# Patient Record
Sex: FEMALE | Race: WHITE | Employment: FULL TIME | URBAN - METROPOLITAN AREA
[De-identification: names, ages, dates, MRNs, and addresses within clinical notes are randomized per-mention and may not be internally consistent; named-entity substitution may affect disease eponyms.]

---

## 2014-09-01 LAB — COLONOSCOPY, EXTERNAL: NORMAL

## 2016-04-11 LAB — MAMMOGRAPHY, EXTERNAL: NORMAL

## 2016-11-01 LAB — AMB DEXA, EXTERNAL: NORMAL

## 2017-08-18 PROBLEM — Z00.00 ANNUAL PHYSICAL EXAM: Status: ACTIVE | Noted: 2017-08-18

## 2017-08-18 PROBLEM — E78.00 HYPERCHOLESTEROLEMIA: Status: ACTIVE | Noted: 2017-08-18

## 2017-08-18 PROBLEM — M70.72 BURSITIS OF LEFT HIP: Status: ACTIVE | Noted: 2017-08-18

## 2017-08-18 PROBLEM — K21.9 CHRONIC GERD: Status: ACTIVE | Noted: 2017-08-18

## 2017-08-18 PROBLEM — R39.9 UTI SYMPTOMS: Status: ACTIVE | Noted: 2017-08-18

## 2017-08-18 PROBLEM — M81.0 OSTEOPOROSIS: Status: ACTIVE | Noted: 2017-08-18

## 2017-08-18 RX ORDER — NEBIVOLOL 10 MG/1
TABLET ORAL DAILY
COMMUNITY
End: 2018-01-09 | Stop reason: DRUGHIGH

## 2017-08-18 RX ORDER — RISEDRONATE SODIUM 150 MG/1
150 TABLET, FILM COATED ORAL
COMMUNITY

## 2017-09-14 ENCOUNTER — LAB ONLY (OUTPATIENT)
Dept: INTERNAL MEDICINE CLINIC | Age: 62
End: 2017-09-14

## 2017-09-14 DIAGNOSIS — Z00.00 ROUTINE GENERAL MEDICAL EXAMINATION AT A HEALTH CARE FACILITY: Primary | ICD-10-CM

## 2017-09-14 LAB
ALBUMIN SERPL-MCNC: 4.3 G/DL (ref 3.9–5.4)
ALKALINE PHOS POC: 104 U/L (ref 38–126)
ALT SERPL-CCNC: 29 U/L (ref 9–52)
AST SERPL-CCNC: 25 U/L (ref 14–36)
BACTERIA UA POCT, BACTPOCT: NORMAL
BILIRUB UR QL STRIP: NEGATIVE
BUN BLD-MCNC: 13 MG/DL (ref 7–17)
CALCIUM BLD-MCNC: 9.3 MG/DL (ref 8.4–10.2)
CASTS UA POCT: 0
CHLORIDE BLD-SCNC: 106 MMOL/L (ref 98–107)
CHOLEST SERPL-MCNC: 212 MG/DL (ref 0–200)
CLUE CELLS, CLUEPOCT: NEGATIVE
CO2 POC: 27 MMOL/L (ref 22–32)
CREAT BLD-MCNC: 0.7 MG/DL (ref 0.7–1.2)
CRYSTALS UA POCT, CRYSPOCT: NEGATIVE
EGFR (POC): 94.8
EPITHELIAL CELLS POCT, EPITHPOCT: NORMAL
GLUCOSE POC: 94 MG/DL (ref 65–105)
GLUCOSE UR-MCNC: NEGATIVE MG/DL
GRAN# POC: 4.5 K/UL (ref 2–7.8)
GRAN% POC: 67.6 % (ref 37–92)
HCT VFR BLD CALC: 42.4 % (ref 37–51)
HDLC SERPL-MCNC: 54 MG/DL (ref 35–130)
HGB BLD-MCNC: 14 G/DL (ref 12–18)
IRON POC: 120 UG/DL (ref 37–170)
IRON SATURATION POC: 26 % (ref 15–55)
KETONES P FAST UR STRIP-MCNC: NEGATIVE MG/DL
LDL CHOLESTEROL POC: 129.4 MG/DL (ref 0–130)
LY# POC: 1.8 K/UL (ref 0.6–4.1)
LY% POC: 28.7 % (ref 10–58.5)
MCH RBC QN: 28.7 PG (ref 26–32)
MCHC RBC-ENTMCNC: 33.1 G/DL (ref 30–36)
MCV RBC: 87 FL (ref 80–97)
MID #, POC: 0.2 K/UL (ref 0–1.8)
MID% POC: 3.7 % (ref 0.1–24)
MUCUS UA POCT, MUCPOCT: NORMAL
PH UR STRIP: 7 [PH] (ref 5–7)
PLATELET # BLD: 260 K/UL (ref 140–440)
POTASSIUM SERPL-SCNC: 4.5 MMOL/L (ref 3.6–5)
PROT SERPL-MCNC: 7.1 G/DL (ref 6.3–8.2)
PROTEIN,URINE POC: NEGATIVE MG/DL
RBC # BLD: 4.89 M/UL (ref 4.2–6.3)
RBC UA POCT, RBCPOCT: 0
SODIUM SERPL-SCNC: 145 MMOL/L (ref 137–145)
SP GR UR STRIP: 1.01 (ref 1.01–1.02)
TCHOL/HDL RATIO (POC): 3.9 (ref 0–4)
TIBC POC: 457 UG/DL (ref 265–497)
TOTAL BILIRUBIN POC: 0.7 MG/DL (ref 0.2–1.3)
TRICH UA POCT, TRICHPOC: NEGATIVE
TRIGL SERPL-MCNC: 143 MG/DL (ref 0–200)
TSH BLD-ACNC: 0.98 UIU/ML (ref 0.4–4.2)
UA UROBILINOGEN AMB POC: NORMAL (ref 0.2–1)
URINALYSIS CLARITY POC: CLEAR
URINALYSIS COLOR POC: NORMAL
URINE BLOOD POC: NEGATIVE
URINE LEUKOCYTES POC: NEGATIVE
URINE NITRITES POC: NEGATIVE
VITAMIN D POC: 47.1 NG/ML (ref 30–96)
VLDLC SERPL CALC-MCNC: 28.6 MG/DL
WBC # BLD: 6.5 K/UL (ref 4.1–10.9)
WBC UA POCT, WBCPOCT: NORMAL
YEAST UA POCT, YEASTPOC: NEGATIVE

## 2017-10-09 ENCOUNTER — OFFICE VISIT (OUTPATIENT)
Dept: INTERNAL MEDICINE CLINIC | Age: 62
End: 2017-10-09

## 2017-10-09 VITALS
OXYGEN SATURATION: 97 % | RESPIRATION RATE: 20 BRPM | DIASTOLIC BLOOD PRESSURE: 80 MMHG | TEMPERATURE: 97.6 F | BODY MASS INDEX: 30.76 KG/M2 | HEART RATE: 66 BPM | WEIGHT: 173.6 LBS | SYSTOLIC BLOOD PRESSURE: 138 MMHG | HEIGHT: 63 IN

## 2017-10-09 DIAGNOSIS — Z00.00 ANNUAL PHYSICAL EXAM: ICD-10-CM

## 2017-10-09 DIAGNOSIS — Z23 ENCOUNTER FOR IMMUNIZATION: Primary | ICD-10-CM

## 2017-10-09 DIAGNOSIS — Z23 NEED FOR ZOSTAVAX ADMINISTRATION: ICD-10-CM

## 2017-10-09 DIAGNOSIS — M81.0 AGE-RELATED OSTEOPOROSIS WITHOUT CURRENT PATHOLOGICAL FRACTURE: ICD-10-CM

## 2017-10-09 RX ORDER — ALENDRONATE SODIUM 70 MG/1
70 TABLET ORAL
Qty: 4 TAB | Refills: 11 | Status: SHIPPED | OUTPATIENT
Start: 2017-10-09

## 2017-10-09 RX ORDER — RANITIDINE 150 MG/1
150 CAPSULE ORAL 2 TIMES DAILY
COMMUNITY

## 2017-10-09 RX ORDER — MINERAL OIL
ENEMA (ML) RECTAL
COMMUNITY

## 2017-10-09 NOTE — LETTER
10/9/2017 12:53 PM 
 
Ms. Mario Riley [address] [facility] [address] [id number] Dear Itzel Ken: 
 
I am writing this letter as a follow-up to your recent physical examination. I have enclosed copies of your lab work for your review. After going over this information, if you have any questions please give me a call. Your current active and past medical problems include: 1. Hypertension. 2. Hypercholesterolemia. 3. Nonallergic rhinitis. 4. You've had colon polyps removed. 5. You've had endometrial ablation for menorrhagia. 6. You had angioneurotic edema secondary to Ramipril. 7. You had a CT scan of the heart in September, 2013. Your coronary artery calcium score was negligible. 8. Osteopenia in the moderate range in your hip and osteoporosis in the lumbar spine. Your current medications are: 1. Bystolic 10 mg daily. 2. Aspirin 81 mg daily. 3. Fexofenadine 180 mg daily. 4. Ranitidine 300 mg daily. 5. Alendronate 70 mg weekly. On physical examination, your height was 5'3\". Your weight was 173. BMI 30.7. Your blood pressure was 156/84. Your pulse was 60 and regular. Other than the elevated blood pressure your physical examination was normal.  Your resting electrocardiogram was normal, as were pulmonary function testing. Your laboratory studies revealed your hemoglobin and iron stores to be normal.  Your blood sugar, potassium, kidney function, calcium level and liver function tests were normal.  Total cholesterol was 212, triglycerides were 143, LDL (bad cholesterol) was 129 and HDL (good cholesterol) was 54. Using these numbers, I calculated a 10 year risk of stroke or heart attack at 6.4%. As long as that number is less than 7.5%, statin drugs are not indicated. Thyroid function, vitamin D level and UA are all normal. 
 
We updated your influenza vaccine.  I sent a prescription for the shingles vaccine, which is indicated at age 63 wait 30 days after the flu vaccine to get that. Tetanus and whooping cough vaccines are current. Your mammogram is overdue and you plan to schedule that. Your next colonoscopy will be due in 2019. After you have taken Alendronate for two years we will repeat your bone density test.  Your eye exam is current with [facility]. I am glad you are exercising regularly. I think a goal weight of less than 165 is doable and that will bring your blood pressure down to the recommended level of 120/80. I look forward to seeing you again in 3 months or sooner should the need arise. Sincerely, Margoth Thomas MD

## 2017-10-09 NOTE — PROGRESS NOTES
Chief Complaint   Patient presents with    Complete Physical     pt here for her annual physical    Immunization/Injection     Flu shot     After obtaining consent, and per orders of Dr. Lori Sosa, injection of Fluarix given by Carmela Rojas LPN. Patient instructed to remain in clinic for 5 minutes afterwards, and to report any adverse reaction to me immediately. No reaction reported.

## 2017-10-09 NOTE — MR AVS SNAPSHOT
Visit Information Date & Time Provider Department Dept. Phone Encounter #  
 10/9/2017  8:00 AM Annette Winters MD [place] [address] [phone] [id number] Upcoming Health Maintenance Date Due Hepatitis C Screening 1955 PAP AKA CERVICAL CYTOLOGY 10/1/1978 FOBT Q 1 YEAR AGE 50-75 10/1/2007 ZOSTER VACCINE AGE 60> 8/1/2017 INFLUENZA AGE 9 TO ADULT 8/1/2017 BREAST CANCER SCRN MAMMOGRAM 4/11/2018 DTaP/Tdap/Td series (2 - Td) 9/24/2023 Allergies as of 10/9/2017  Review Complete On: 3/9/2014 By: Alondra Collins RN Severity Noted Reaction Type Reactions Ace Inhibitors  03/09/2014   Systemic Angioedema Prednisone  03/09/2014   Side Effect Hives Prilosec [Omeprazole]  08/18/2017    Unknown (comments) Ramipril  08/18/2017    Angioedema Current Immunizations  Never Reviewed Name Date Influenza Vaccine 9/24/2014 Influenza Vaccine (Quad) PF 10/9/2017 Tdap 9/24/2013 Zoster Vaccine, Live  Incomplete Not reviewed this visit You Were Diagnosed With   
  
 Codes Comments Encounter for immunization    -  Primary ICD-10-CM: Q73 ICD-9-CM: V03.89 Annual physical exam     ICD-10-CM: Z00.00 ICD-9-CM: V70.0 Need for Zostavax administration     ICD-10-CM: C59 ICD-9-CM: V04.89 Age-related osteoporosis without current pathological fracture     ICD-10-CM: M81.0 ICD-9-CM: 733.01 Vitals BP Pulse Temp Resp Height(growth percentile) Weight(growth percentile) 138/80 (BP 1 Location: Left arm, BP Patient Position: Sitting) 66 97.6 °F (36.4 °C) (Oral) 20 5' 3\" (1.6 m) 173 lb 9.6 oz (78.7 kg) SpO2 BMI OB Status Smoking Status 97% 30.75 kg/m2 Postmenopausal Never Smoker Vitals History BMI and BSA Data Body Mass Index Body Surface Area 30.75 kg/m 2 1.87 m 2 Preferred Pharmacy Pharmacy Name Phone [facility]ACY #[phone] [phone] Your Updated Medication List  
  
   
This list is accurate as of: 10/9/17  9:20 AM.  Always use your most recent med list.  
  
  
  
  
 ACTONEL 150 mg tablet Generic drug:  risedronate Take 150 mg by mouth every thirty (30) days. alendronate 70 mg tablet Commonly known as:  FOSAMAX Take 1 Tab by mouth every seven (7) days. ALLEGRA ALLERGY 180 mg tablet Generic drug:  fexofenadine Take  by mouth. aspirin delayed-release 81 mg tablet Take 81 mg by mouth daily. BENADRYL 25 mg capsule Generic drug:  diphenhydrAMINE Take 25 mg by mouth every six (6) hours as needed. BYSTOLIC 10 mg tablet Generic drug:  nebivolol Take  by mouth daily. EPINEPHrine 0.3 mg/0.3 mL injection Commonly known as:  EPIPEN  
0.3 mL by IntraMUSCular route once as needed for 1 dose. FISH OIL 1,000 mg Cap Generic drug:  omega-3 fatty acids-vitamin e Take 1 Cap by mouth. hydroCHLOROthiazide 25 mg tablet Commonly known as:  HYDRODIURIL Take 25 mg by mouth daily. multivitamin tablet Commonly known as:  ONE A DAY Take 1 Tab by mouth daily. raNITIdine hcl 150 mg capsule Take 150 mg by mouth two (2) times a day. VITAMIN D3 1,000 unit Cap Generic drug:  cholecalciferol Take  by mouth. Prescriptions Sent to Pharmacy Refills  
 alendronate (FOSAMAX) 70 mg tablet 11 Sig: Take 1 Tab by mouth every seven (7) days. Class: Normal  
 Pharmacy: [address] Ph #: [phone] Route: Oral  
  
We Performed the Following INFLUENZA VIRUS VAC QUAD,SPLIT,PRESV FREE SYRINGE IM [id number] CPT(R)] [place] IMMUNIZ ADMIN,1 SINGLE/COMB VAC/TOXOID [id number] CPT(R)] ZOSTER (SHINGLES) VACCINE, LIVE, SC INJECTION [id number] CPT(R)] Patient Instructions Vaccine Information Statement Influenza (Flu) Vaccine (Inactivated or Recombinant): What you need to know Many Vaccine Information Statements are available in [place] and other languages. See www.immunize.org/vis Hojas de Información Sobre Vacunas están disponibles en Español y en muchos otros idiomas. Visite www.immunize.org/vis 1. Why get vaccinated? Influenza (flu) is a contagious disease that spreads around the [facility] every year, usually between October and May. Flu is caused by influenza viruses, and is spread mainly by coughing, sneezing, and close contact. Anyone can get flu. Flu strikes suddenly and can last several days. Symptoms vary by age, but can include: 
 fever/chills  sore throat  muscle aches  fatigue  cough  headache  runny or stuffy nose Flu can also lead to pneumonia and blood infections, and cause diarrhea and seizures in children. If you have a medical condition, such as heart or lung disease, flu can make it worse. Flu is more dangerous for some people. Infants and young children, people 72years of age and older, pregnant women, and people with certain health conditions or a weakened immune system are at greatest risk. Each year thousands of people in the [facility] die from flu, and many more are hospitalized. Flu vaccine can: 
 keep you from getting flu, 
 make flu less severe if you do get it, and 
 keep you from spreading flu to your family and other people. 2. Inactivated and recombinant flu vaccines A dose of flu vaccine is recommended every flu season. Children 6 months through 6years of age may need two doses during the same flu season. Everyone else needs only one dose each flu season.   
 
 
Some inactivated flu vaccines contain a very small amount of a mercury-based preservative called thimerosal. Studies have not shown thimerosal in vaccines to be harmful, but flu vaccines that do not contain thimerosal are available. There is no live flu virus in flu shots. They cannot cause the flu. There are many flu viruses, and they are always changing. Each year a new flu vaccine is made to protect against three or four viruses that are likely to cause disease in the upcoming flu season. But even when the vaccine doesnt exactly match these viruses, it may still provide some protection Flu vaccine cannot prevent: 
 flu that is caused by a virus not covered by the vaccine, or 
 illnesses that look like flu but are not. It takes about 2 weeks for protection to develop after vaccination, and protection lasts through the flu season. 3. Some people should not get this vaccine Tell the person who is giving you the vaccine:  If you have any severe, life-threatening allergies. If you ever had a life-threatening allergic reaction after a dose of flu vaccine, or have a severe allergy to any part of this vaccine, you may be advised not to get vaccinated. Most, but not all, types of flu vaccine contain a small amount of egg protein.  If you ever had Guillain-Barré Syndrome (also called GBS). Some people with a history of GBS should not get this vaccine. This should be discussed with your doctor.  If you are not feeling well. It is usually okay to get flu vaccine when you have a mild illness, but you might be asked to come back when you feel better. 4. Risks of a vaccine reaction With any medicine, including vaccines, there is a chance of reactions. These are usually mild and go away on their own, but serious reactions are also possible. Most people who get a flu shot do not have any problems with it. Minor problems following a flu shot include:  
 soreness, redness, or swelling where the shot was given  hoarseness  sore, red or itchy eyes  cough  fever  aches  headache face and throat, difficulty breathing, a fast heartbeat, dizziness, and weakness  usually within a few minutes to a few hours after the vaccination. What should I do?  If you think it is a severe allergic reaction or other emergency that cant wait, call 9-1-1 and get the person to the nearest hospital. Otherwise, call your doctor.  Reactions should be reported to the Vaccine Adverse Event Reporting System (VAERS). Your doctor should file this report, or you can do it yourself through  the VAERS web site at www.vaers. [facility].gov, or by calling 5-830.162.8188. VAERS does not give medical advice. 6. The National Vaccine Injury Compensation Program 
 
The [facility] Vaccine Injury Compensation Program (VICP) is a federal program that was created to compensate people who may have been injured by certain vaccines. Persons who believe they may have been injured by a vaccine can learn about the program and about filing a claim by calling 1-197.807.1046 or visiting the [employer] website at www.[facility].gov/vaccinecompensation. There is a time limit to file a claim for compensation. 7. How can I learn more?  Ask your healthcare provider. He or she can give you the vaccine package insert or suggest other sources of information.  Call your local or state health department.  Contact the Centers for Disease Control and Prevention (CDC): 
- Call 4-268.991.9435 (1-800-CDC-INFO) or 
- Visit CDCs website at www.cdc.gov/flu Vaccine Information Statement Inactivated Influenza Vaccine 8/7/2015 
42 ROMULOAlistair Lunsford [id number] Department of Health and [employer] Centers for Disease Control and Prevention Office Use Only Introducing [facility]! [facility] introduces [employer] patient portal. Now you can access parts of your medical record, email your doctor's office, and request medication refills online. 1. In your internet browser, go to [email or web address] How do you roll?[email or web address] 2. Click on the First Time User? Click Here link in the Sign In box. You will see the New Member Sign Up page. 3. Enter your [employer] Access Code exactly as it appears below. You will not need to use this code after youve completed the sign-up process. If you do not sign up before the expiration date, you must request a new code. · [employer] Access Code: [email or web address] Expires: 1/7/2018  8:16 AM 
 
4. Enter the last four digits of your Social Security Number (xxxx) and Date of Birth (mm/dd/yyyy) as indicated and click Submit. You will be taken to the next sign-up page. 5. Create a LiveExercise ID. This will be your LiveExercise login ID and cannot be changed, so think of one that is secure and easy to remember. 6. Create a [employer] password. You can change your password at any time. 7. Enter your Password Reset Question and Answer. This can be used at a later time if you forget your password. 8. Enter your e-mail address. You will receive e-mail notification when new information is available in [address] 9. Click Sign Up. You can now view and download portions of your medical record. 10. Click the Download Summary menu link to download a portable copy of your medical information. If you have questions, please visit the Frequently Asked Questions section of the [employer] website. Remember, [employer] is NOT to be used for urgent needs. For medical emergencies, dial 911. Now available from your iPhone and Android! Please provide this summary of care documentation to your next provider. Your primary care clinician is listed as Blanca Chavis II. If you have any questions after today's visit, please call [phone].

## 2017-10-09 NOTE — PROGRESS NOTES
Itzel Ken comes to the office today for a comprehensive medical evaluation. Past Medical History:  1. Hypertension. 2. Hypercholesterolemia. 3. Nonallergic rhinitis. 4. Eczema. 5. G2, P2, AB0.  6. Status post endometrial ablation for menorrhagia . 7. Status post colonoscopic polypectomy. 8. Angioneurotic edema and urticaria secondary to Ramipril. 9. CT scan of the heart . Coronary artery calcium score 2.  10. History of bruxism with TMJ dysfunction. 11. Osteopenia in the hip and osteoporosis in the lumbar spine. Current Medications:  1. Bystolic 10 mg daily. 2. Aspirin 81 mg daily. 3. Fexofenadine 180 mg daily. 4. Ranitidine 300 mg daily. Drug Allergies:   1. Ramipril caused angioneurotic edema and hives. 2. Lipitor caused severe leg pain. 3. Omeprazole caused hives. Social History:  She is . 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 She is a nonsmoker and social drinker. Family History:  Her mother had bypass surgery at age 58 and  at age 80. She was also a type 2 diabetic. Her father had angioplasty at age 72 and  at age 80 from dementia. She has a sister who  at age 54 from complications of heart disease, diabetes and end stage renal disease. She has another brother who  at age 62 from heart disease, renal disease and diabetes. She has two brothers ages 46 and 64, who have had angioplasties. She has a sister who is morbidly obese and has high cholesterol. There is no family history of colon, breast or ovarian cancer. Review of Systems:  She still exercises three days a week at [facility] AND [place]. She just returned from a two week trip to [facility], so she's been off of her diet. She feels well. She denies chest pain, chest pressure, chest tightness, shortness of breath, PND, orthopnea or ankle edema. The remainder of the ROS is negative. Physical Examination:  GENERAL:  HT: 5'3\". WT: 173. BMI: 30.7. BP: 138/80 by the nurse, I got 156/84. T: 97.6. P: 66 and regular. O2 saturation on room air: 97%. HEENT:  Head normocephalic, atraumatic. Eyes - pupils midrange, equal directly and consensually. Extraocular movements are normal.  Ears, nose and throat are normal.  NECK:  Without JVD, adenopathy, thyromegaly or carotid bruits. LUNGS:  Clear. BREASTS:  Without masses. HEART:   Quiet precordium. Regular rate and rhythm. No audible murmurs or gallops. ABDOMEN:  Soft, non tender without hepatosplenomegaly, masses or bruits. EXTREMITIES:  Without edema. Pulses are normal.  SKIN:  She has extensive solar damage on sun exposed areas, but no definite suspicious lesions. NEUROLOGICAL:  Cranial nerves II-XII are intact. Strength, gait and mental status are normal for age. Studies:  EKG - sinus bradycardia, otherwise within normal limits. pulmonary function testing normal.    Laboratory:  HGB and iron stores are normal.  Blood sugar, potassium, renal function, calcium level and LFTs are normal.  Total cholesterol 212, triglycerides 143, , HDL 54. Vitamin D level 47. TSH 0.98.  UA is normal.    Impression:  1. Hypertension, poor control. 2. Hyperlipidemia. 3. Nonallergic rhinitis. 4. Status post endometrial ablation. 5. Status post colonoscopic polypectomy. 6. Osteopenia of the hip and osteoporosis of the spine. Plan:  1. Flu vaccine given today. 2. Prescription sent for Zostavax. 3. Tetanus and whooping cough were up to date. 4. Next colonoscopy will be due in 2019.  5. Last year based on results of her bone density scan I prescribed Actonel, which was not covered by her insurance, so we will try for Alendronate 70 mg weekly this year and rescan in two years. 6. Eye exam is current. 7. Mammogram is overdue and she will schedule that. 8. I recommended calorie restriction and continued exercise.   I'd like to get her weight down below 165.    9. Recheck blood pressure in three months.

## 2017-10-09 NOTE — PATIENT INSTRUCTIONS
Vaccine Information Statement    Influenza (Flu) Vaccine (Inactivated or Recombinant): What you need to know    Many Vaccine Information Statements are available in [place] and other languages. See www.immunize.org/vis  Hojas de Información Sobre Vacunas están disponibles en Español y en muchos otros idiomas. Visite www.immunize.org/vis    1. Why get vaccinated? Influenza (flu) is a contagious disease that spreads around the [place] every year, usually between October and May. Flu is caused by influenza viruses, and is spread mainly by coughing, sneezing, and close contact. Anyone can get flu. Flu strikes suddenly and can last several days. Symptoms vary by age, but can include:   fever/chills   sore throat   muscle aches   fatigue   cough   headache    runny or stuffy nose    Flu can also lead to pneumonia and blood infections, and cause diarrhea and seizures in children. If you have a medical condition, such as heart or lung disease, flu can make it worse. Flu is more dangerous for some people. Infants and young children, people 72years of age and older, pregnant women, and people with certain health conditions or a weakened immune system are at greatest risk. Each year thousands of people in the [facility] die from flu, and many more are hospitalized. Flu vaccine can:   keep you from getting flu,   make flu less severe if you do get it, and   keep you from spreading flu to your family and other people. 2. Inactivated and recombinant flu vaccines    A dose of flu vaccine is recommended every flu season. Children 6 months through 6years of age may need two doses during the same flu season. Everyone else needs only one dose each flu season.        Some inactivated flu vaccines contain a very small amount of a mercury-based preservative called thimerosal. Studies have not shown thimerosal in vaccines to be harmful, but flu vaccines that do not contain thimerosal are available. There is no live flu virus in flu shots. They cannot cause the flu. There are many flu viruses, and they are always changing. Each year a new flu vaccine is made to protect against three or four viruses that are likely to cause disease in the upcoming flu season. But even when the vaccine doesnt exactly match these viruses, it may still provide some protection    Flu vaccine cannot prevent:   flu that is caused by a virus not covered by the vaccine, or   illnesses that look like flu but are not. It takes about 2 weeks for protection to develop after vaccination, and protection lasts through the flu season. 3. Some people should not get this vaccine    Tell the person who is giving you the vaccine:     If you have any severe, life-threatening allergies. If you ever had a life-threatening allergic reaction after a dose of flu vaccine, or have a severe allergy to any part of this vaccine, you may be advised not to get vaccinated. Most, but not all, types of flu vaccine contain a small amount of egg protein.  If you ever had Guillain-Barré Syndrome (also called GBS). Some people with a history of GBS should not get this vaccine. This should be discussed with your doctor.  If you are not feeling well. It is usually okay to get flu vaccine when you have a mild illness, but you might be asked to come back when you feel better. 4. Risks of a vaccine reaction    With any medicine, including vaccines, there is a chance of reactions. These are usually mild and go away on their own, but serious reactions are also possible. Most people who get a flu shot do not have any problems with it.      Minor problems following a flu shot include:    soreness, redness, or swelling where the shot was given     hoarseness   sore, red or itchy eyes   cough   fever   aches   headache   itching   fatigue  If these problems occur, they usually begin soon after the shot and last 1 or 2 days. More serious problems following a flu shot can include the following:     There may be a small increased risk of Guillain-Barré Syndrome (GBS) after inactivated flu vaccine. This risk has been estimated at 1 or 2 additional cases per million people vaccinated. This is much lower than the risk of severe complications from flu, which can be prevented by flu vaccine.  Young children who get the flu shot along with pneumococcal vaccine (PCV13) and/or DTaP vaccine at the same time might be slightly more likely to have a seizure caused by fever. Ask your doctor for more information. Tell your doctor if a child who is getting flu vaccine has ever had a seizure. Problems that could happen after any injected vaccine:      People sometimes faint after a medical procedure, including vaccination. Sitting or lying down for about 15 minutes can help prevent fainting, and injuries caused by a fall. Tell your doctor if you feel dizzy, or have vision changes or ringing in the ears.  Some people get severe pain in the shoulder and have difficulty moving the arm where a shot was given. This happens very rarely.  Any medication can cause a severe allergic reaction. Such reactions from a vaccine are very rare, estimated at about 1 in a million doses, and would happen within a few minutes to a few hours after the vaccination. As with any medicine, there is a very remote chance of a vaccine causing a serious injury or death. The safety of vaccines is always being monitored. For more information, visit: www.cdc.gov/vaccinesafety/    5. What if there is a serious reaction? What should I look for?  Look for anything that concerns you, such as signs of a severe allergic reaction, very high fever, or unusual behavior.     Signs of a severe allergic reaction can include hives, swelling of the face and throat, difficulty breathing, a fast heartbeat, dizziness, and weakness  usually within a few minutes to a few hours after the vaccination. What should I do?  If you think it is a severe allergic reaction or other emergency that cant wait, call 9-1-1 and get the person to the nearest hospital. Otherwise, call your doctor.  Reactions should be reported to the Vaccine Adverse Event Reporting System (VAERS). Your doctor should file this report, or you can do it yourself through  the VAERS web site at www.vaers. [facility].gov, or by calling 6-734.744.5760. VAERS does not give medical advice. 6. The National Vaccine Injury Compensation Program    The [facility] Vaccine Injury Compensation Program (VICP) is a federal program that was created to compensate people who may have been injured by certain vaccines. Persons who believe they may have been injured by a vaccine can learn about the program and about filing a claim by calling 2-390.748.1757 or visiting the [employer] website at www.[facility].gov/vaccinecompensation. There is a time limit to file a claim for compensation. 7. How can I learn more?  Ask your healthcare provider. He or she can give you the vaccine package insert or suggest other sources of information.  Call your local or state health department.  Contact the Centers for Disease Control and Prevention (CDC):  - Call 8-697.608.7074 (1-800-CDC-INFO) or  - Visit CDCs website at www.cdc.gov/flu    Vaccine Information Statement   Inactivated Influenza Vaccine   8/7/2015  [address] [facility] Johnny [id number]    Department of Health and Human Services  Centers for Disease Control and Prevention    Office Use Only

## 2018-01-09 ENCOUNTER — OFFICE VISIT (OUTPATIENT)
Dept: INTERNAL MEDICINE CLINIC | Age: 63
End: 2018-01-09

## 2018-01-09 VITALS
DIASTOLIC BLOOD PRESSURE: 87 MMHG | SYSTOLIC BLOOD PRESSURE: 145 MMHG | RESPIRATION RATE: 20 BRPM | BODY MASS INDEX: 30.71 KG/M2 | WEIGHT: 173.3 LBS | HEART RATE: 67 BPM | HEIGHT: 63 IN | TEMPERATURE: 99.2 F | OXYGEN SATURATION: 97 %

## 2018-01-09 DIAGNOSIS — I10 ESSENTIAL HYPERTENSION: Primary | ICD-10-CM

## 2018-01-09 RX ORDER — NEBIVOLOL 20 MG/1
20 TABLET ORAL DAILY
Qty: 30 TAB | Refills: 11 | Status: SHIPPED | OUTPATIENT
Start: 2018-01-09

## 2018-01-09 NOTE — PROGRESS NOTES
1. Have you been to the ER, urgent care clinic since your last visit? Hospitalized since your last visit? No    2. Have you seen or consulted any other health care providers outside of the [address] [place] [address] since your last visit? Include any pap smears or colon screening.  No   Chief Complaint   Patient presents with    Follow-up     BP check

## 2018-01-09 NOTE — PROGRESS NOTES
Jed Angela comes to the office today for a blood pressure check. Medications:  Outlined in office visit of 10/09/17. She is feeling well. She's not managed to get her weight below 165. Physical Examination:  GENERAL:  T: 99.2. BP: 145/87, 140/90 on two determinations. T: 99.2.  P: 67 and regular. LUNGS:  Clear. HEART:  Regular rhythm, no murmurs or gallops. EXTREMITIES:  Without edema. Impression:  1. Hypertension, poor control. Plan:  1. Increase Bystolic to 20 mg daily. 2. Continue attempts at weight loss. 3. Recheck blood pressure in three months.

## 2018-03-09 RX ORDER — NEBIVOLOL HYDROCHLORIDE 10 MG/1
TABLET ORAL
Qty: 90 TAB | Refills: 3 | Status: SHIPPED | OUTPATIENT
Start: 2018-03-09

## 2018-04-19 ENCOUNTER — OFFICE VISIT (OUTPATIENT)
Dept: INTERNAL MEDICINE CLINIC | Age: 63
End: 2018-04-19

## 2018-04-19 VITALS
HEART RATE: 61 BPM | SYSTOLIC BLOOD PRESSURE: 130 MMHG | WEIGHT: 174 LBS | OXYGEN SATURATION: 96 % | BODY MASS INDEX: 30.83 KG/M2 | HEIGHT: 63 IN | DIASTOLIC BLOOD PRESSURE: 80 MMHG

## 2018-04-19 DIAGNOSIS — I10 ESSENTIAL HYPERTENSION: Primary | ICD-10-CM

## 2018-04-19 NOTE — PROGRESS NOTES
Subjective:  Ms. Adolfo Riedel comes to the office today in follow up for:  1. Hypertension. 2. Hypercholesterolemia. Current Medications:  1. Bystolic 20 mg daily. 2. Aspirin 81 mg daily. 3. Fexofenadine 180 mg daily. 4. Ranitidine 300 mg daily. She is using steroid nasal spray as well. She is having some allergy symptoms, but otherwise feels well. Physical Examination:  GENERAL:  WT: 174. BP: 130/80 x two. P: 60 and regular. O2 sat on room air: 96%. HEENT:  Unremarkable. NECK:  Without adenopathy or bruits. CHEST:  Lungs clear. CARDIAC:  Heart regular rhythm without murmurs or gallops. Impression:  1. Hypertension, good control. Plan:  1. Continue current medication regimen as outlined. 2. Ms. Adolfo Riedel is now on ObamaCare and will have to seek her medical care at another [de-identified] office. I told her I'd be happy to assist in transfer of her medical records. 3. Follow up here prn.

## 2018-04-19 NOTE — PROGRESS NOTES
Keven Youngbloodman presents with   Chief Complaint   Patient presents with    Follow-up     3 month   Patient here for a 3 month followup and blood pressure check. 1. Have you been to the ER, urgent care clinic since your last visit? Hospitalized since your last visit? No    2. Have you seen or consulted any other health care providers outside of the [address] [place] [address] since your last visit? Include any pap smears or colon screening.  No

## 2019-09-24 PROBLEM — Z23 NEED FOR ZOSTAVAX ADMINISTRATION: Status: RESOLVED | Noted: 2017-10-09 | Resolved: 2019-09-24

## 2019-09-25 PROBLEM — Z00.00 ANNUAL PHYSICAL EXAM: Status: RESOLVED | Noted: 2017-08-18 | Resolved: 2019-09-25
